# Patient Record
Sex: FEMALE | URBAN - METROPOLITAN AREA
[De-identification: names, ages, dates, MRNs, and addresses within clinical notes are randomized per-mention and may not be internally consistent; named-entity substitution may affect disease eponyms.]

---

## 2020-01-23 ENCOUNTER — MEDICAL CORRESPONDENCE (OUTPATIENT)
Dept: HEALTH INFORMATION MANAGEMENT | Facility: CLINIC | Age: 70
End: 2020-01-23

## 2020-06-08 ENCOUNTER — TRANSFERRED RECORDS (OUTPATIENT)
Dept: HEALTH INFORMATION MANAGEMENT | Facility: CLINIC | Age: 70
End: 2020-06-08

## 2020-06-11 ENCOUNTER — TRANSFERRED RECORDS (OUTPATIENT)
Dept: HEALTH INFORMATION MANAGEMENT | Facility: CLINIC | Age: 70
End: 2020-06-11

## 2020-06-12 ENCOUNTER — MEDICAL CORRESPONDENCE (OUTPATIENT)
Dept: HEALTH INFORMATION MANAGEMENT | Facility: CLINIC | Age: 70
End: 2020-06-12

## 2020-06-15 ENCOUNTER — TRANSCRIBE ORDERS (OUTPATIENT)
Dept: OTHER | Age: 70
End: 2020-06-15

## 2020-06-15 DIAGNOSIS — K86.1 CHRONIC PANCREATITIS (H): ICD-10-CM

## 2020-06-15 DIAGNOSIS — K86.89 DILATION OF PANCREATIC DUCT: ICD-10-CM

## 2020-06-15 DIAGNOSIS — K86.89 PANCREATIC ATROPHY: Primary | ICD-10-CM

## 2020-06-15 DIAGNOSIS — K86.89 PANCREATIC DUCT STONES: ICD-10-CM

## 2020-07-22 ENCOUNTER — DOCUMENTATION ONLY (OUTPATIENT)
Dept: CARE COORDINATION | Facility: CLINIC | Age: 70
End: 2020-07-22

## 2020-07-27 ENCOUNTER — VIRTUAL VISIT (OUTPATIENT)
Dept: GASTROENTEROLOGY | Facility: CLINIC | Age: 70
End: 2020-07-27
Payer: MEDICARE

## 2020-07-27 VITALS — HEIGHT: 63 IN | BODY MASS INDEX: 16.55 KG/M2 | WEIGHT: 93.4 LBS

## 2020-07-27 DIAGNOSIS — K86.0 ALCOHOL-INDUCED CHRONIC PANCREATITIS (H): Primary | ICD-10-CM

## 2020-07-27 RX ORDER — LISINOPRIL 5 MG/1
5 TABLET ORAL
COMMUNITY

## 2020-07-27 ASSESSMENT — PAIN SCALES - GENERAL: PAINLEVEL: NO PAIN (0)

## 2020-07-27 ASSESSMENT — MIFFLIN-ST. JEOR: SCORE: 907.79

## 2020-07-27 NOTE — LETTER
7/27/2020         RE: Angie Macias  [address]  [facility] [address]        Dear Colleague,    Thank you for referring your patient, Angie Macias, to the [facility]. Please see a copy of my visit note below.    Angie Macias is a 69 year old female who is being evaluated via a billable video visit.        During this virtual visit the patient is located in [place], patient verifies this as the location during the entirety of this visit.         Video-Visit Details    Type of service:  Video Visit    1st VideoStart:  07/27/2020 11:26 am   Stop: 07/27/2020 11:58 am    Originating Location (pt. Location): Home    Distant Location (provider location):  [facility]     Platform used for Video Visit: [employer]     Note below cut and pasted from the referral note to Alba Bentley RN    To summarize, this is a 69-year-old woman referred for evaluation of chronic pancreatitis, pancreatic stones, and severe malnutrition.  Unfortunately, during the visit, there are no records available through care everywhere in the scanned records into media as usual freeze the computer and do not allow viewing on the PC.  Most of the history is obtained from the patient and those limited records we could see.  The excellent summary by Alba is copied and pasted below.    Essentials of our history are a long history of alcohol abuse and the patient is still struggling, admitting to last drink only 2 months ago.  She underwent a revision of a gastric bypass by open surgery at [facility] in 2015.  This was due to frequent ulceration and stricturing.  It is not clear how much stomach she has remaining.  Her subsequent care is all at [facility] in [place], which is not available on care everywhere.  In any case she has had severe problems with malnutrition and maldigestion.  Apparently got down to a weight of 78 pounds.  Has a G-tube which she uses daily for the majority of her nutrition.  She was on  round-the-clock tube feeds but found that very inconvenient and is now on nighttime feedings.  She works with a dietitian at [place].    Of note is she was recently prescribed Creon capsules 6000 units 2 with each meal but the co-pay was $600 and did not fill the prescription.  Her stools are regular although not overtly greasy.  She has not had a fecal elastase that we know of.    Impression: We spent 30 minutes more than 50% counseling on a video virtual visit.  The patient has severe alcoholic chronic pancreatitis with obstructing stones as a secondary byproduct in the neck and body of the pancreas.  Her symptoms are mostly systemic fatigue and malnutrition related rather than intractable abdominal pain.  She still is struggling with alcoholism.  Her primary problem is severe malnutrition and maldigestion likely due to exocrine pancreatic insufficiency.  We discussed that even in the past of systemic health endoscopic removal of pancreatic stones is a drawnout process requiring several prolonged general anesthesias.  It may or may not ameliorate symptoms of chronic pancreatitis and certainly would not improve EPI.  Given her very fragile condition and a revised gastric bypass that would require even more procedures by mouth which may or may not be successful including endoscopic ultrasound placement of a lumen expanding stent from the pouch to the excluded stomach to create access.  I am not sure that would be possible but it would be very stressful and not advisable for this fragile patient    A recommendation #1 check fecal elastase in [place] #2 see if we can find a subsidized program for enzyme replacement that would ideally be liquid into the feeding tube to best mix with her nutrition in the duodenum rather than per oral which would go down the gastric bypass.  Also the dose needs to be adjusted far upwards to be the equivalent of 72,000 units with each meal or however that should be adjusted for  liquefied G-tube supplementation.   #3 the focus of her care at this stage is probably best palliative rather than interventional    I would like to thank her team for referring her and will ask Alba to contact the patient and her primary for follow-up regarding enzyme supplementation    Referring provider:  Dr Jose Miguel HANEY     Referred to: [facility]      Provider Requested:  Michael     Referral Received: 6/16/2020     Records received: 6/16/2020      Images received: requested 6/22     Evaluation for: pancreatic atrophy w/chronic pancreatitis with main pancreatic duct dilation and stone in pancreatic body- Hx RNY     Clinical History (per RN review):      Currently has Gtube for feeds r/t malnutrition from chronic pancreatitis.      Hospitalized May 2020 for pancreatitis, MRCP 6/8/2020, revealed atrophy of the pancreas representing chronic pancreatitis associated with marked ductal dilation of the pancreatic duct, 6.5 mm of the pancreatic tail, 2.5 mm the pancreatic head.  Stone identified in the pancreatic body with greater than 3 side branches which were all dilated and ectatic.  Variation in ductal anatomy with dorsal pancreatic duct draining the major papilla with the common bile duct.  This measures 1.2 mm in diameter, the ventral duct drains into caudal to the major papilla, some usual anatomy.     Triglyceride and IgG4 levels also obtained within normal limits.      MRCP done 6/8/2020     Impression     1.  Atrophy of the pancreas representing chronic pancreatitis associated with marked ductal dilation of the main pancreatic duct.     2.  Stone identified in the pancreatic body with greater than 3 side branches which are dilated and ectatic.     3.  Variation in ductal anatomy with the dorsal pancreatic duct draining at the major papilla in the common bile duct.  This measures 1.2 mm in diameter.  The ventral duct drains caudal to the major papilla a somewhat usual anatomy.       Other pertinent psychosocial notes:     Referring doctor note's show involvement of daughter and mom's care.  Daughter states that patient has been drinking alcohol, patient denies.  There is also some discussion the patient has an eating disorder.  Weight noted in clinic note is 88 pounds.  Currently on tube feeding.  Daughter states that patient previously said she has not went away over 100 pounds.  History of Ana Cristina-en-Y.     Daughter states mom has fallen 5 times since Monday.  Currently living with daughter.  Angie was planning on going to an inpatient behavioral health unit for alcohol abuse and depression but due to recent health issues she is unable to go with this time.     MD review date:   MD Decision for clinic consultation/Orders:            Referral updates/Patient contacted:        Deny Rodriguez MD

## 2020-07-27 NOTE — NURSING NOTE
"Chief Complaint   Patient presents with     Consult     Virtual consult       Vitals:    07/27/20 1059   Weight: 42.4 kg (93 lb 6.4 oz)   Height: 1.6 m (5' 3\")       Body mass index is 16.55 kg/m .      YESENIA CrabtreeT                      "

## 2020-07-27 NOTE — PROGRESS NOTES
"Angie Macias is a 69 year old female who is being evaluated via a billable video visit.      The patient has been notified of following:     \"This video visit will be conducted via a call between you and your physician/provider. We have found that certain health care needs can be provided without the need for an in-person physical exam.  This service lets us provide the care you need with a video conversation.  If a prescription is necessary we can send it directly to your pharmacy.  If lab work is needed we can place an order for that and you can then stop by our lab to have the test done at a later time.    Video visits are billed at different rates depending on your insurance coverage.  Please reach out to your insurance provider with any questions.    If during the course of the call the physician/provider feels a video visit is not appropriate, you will not be charged for this service.\"    Patient has given verbal consent for Video visit? Yes  How would you like to obtain your AVS? MyChart  If you are dropped from the video visit, the video invite should be resent to: Send to e-mail at: [email or web address]  Will anyone else be joining your video visit?         During this virtual visit the patient is located in [place], patient verifies this as the location during the entirety of this visit.         Video-Visit Details    Type of service:  Video Visit    1st VideoStart:  07/27/2020 11:26 am   Stop: 07/27/2020 11:58 am    Originating Location (pt. Location): Home    Distant Location (provider location):  [facility]     Platform used for Video Visit: [employer]     Note below cut and pasted from the referral note to Alba Bentley RN    To summarize, this is a 69-year-old woman referred for evaluation of chronic pancreatitis, pancreatic stones, and severe malnutrition.  Unfortunately, during the visit, there are no records available through care everywhere in the scanned records into media as usual freeze " the computer and do not allow viewing on the PC.  Most of the history is obtained from the patient and those limited records we could see.  The excellent summary by Alba is copied and pasted below.    Essentials of our history are a long history of alcohol abuse and the patient is still struggling, admitting to last drink only 2 months ago.  She underwent a revision of a gastric bypass by open surgery at [place] in 2015.  This was due to frequent ulceration and stricturing.  It is not clear how much stomach she has remaining.  Her subsequent care is all at [facility] in [place], which is not available on care everywhere.  In any case she has had severe problems with malnutrition and maldigestion.  Apparently got down to a weight of 78 pounds.  Has a G-tube which she uses daily for the majority of her nutrition.  She was on round-the-clock tube feeds but found that very inconvenient and is now on nighttime feedings.  She works with a dietitian at [facility].    Of note is she was recently prescribed Creon capsules 6000 units 2 with each meal but the co-pay was $600 and did not fill the prescription.  Her stools are regular although not overtly greasy.  She has not had a fecal elastase that we know of.    Impression: We spent 30 minutes more than 50% counseling on a video virtual visit.  The patient has severe alcoholic chronic pancreatitis with obstructing stones as a secondary byproduct in the neck and body of the pancreas.  Her symptoms are mostly systemic fatigue and malnutrition related rather than intractable abdominal pain.  She still is struggling with alcoholism.  Her primary problem is severe malnutrition and maldigestion likely due to exocrine pancreatic insufficiency.  We discussed that even in the past of systemic health endoscopic removal of pancreatic stones is a drawnout process requiring several prolonged general anesthesias.  It may or may not ameliorate symptoms of chronic pancreatitis and certainly  would not improve EPI.  Given her very fragile condition and a revised gastric bypass that would require even more procedures by mouth which may or may not be successful including endoscopic ultrasound placement of a lumen expanding stent from the pouch to the excluded stomach to create access.  I am not sure that would be possible but it would be very stressful and not advisable for this fragile patient    A recommendation #1 check fecal elastase in [place] #2 see if we can find a subsidized program for enzyme replacement that would ideally be liquid into the feeding tube to best mix with her nutrition in the duodenum rather than per oral which would go down the gastric bypass.  Also the dose needs to be adjusted far upwards to be the equivalent of 72,000 units with each meal or however that should be adjusted for liquefied G-tube supplementation.   #3 the focus of her care at this stage is probably best palliative rather than interventional    I would like to thank her team for referring her and will ask Alba to contact the patient and her primary for follow-up regarding enzyme supplementation    Referring provider:  Dr Jose Miguel HANEY     Referred to: [facility]      Provider Requested:  Michael     Referral Received: 6/16/2020     Records received: 6/16/2020      Images received: requested 6/22     Evaluation for: pancreatic atrophy w/chronic pancreatitis with main pancreatic duct dilation and stone in pancreatic body- Hx RNY     Clinical History (per RN review):      Currently has Gtube for feeds r/t malnutrition from chronic pancreatitis.      Hospitalized May 2020 for pancreatitis, MRCP 6/8/2020, revealed atrophy of the pancreas representing chronic pancreatitis associated with marked ductal dilation of the pancreatic duct, 6.5 mm of the pancreatic tail, 2.5 mm the pancreatic head.  Stone identified in the pancreatic body with greater than 3 side branches which were all dilated and  ectatic.  Variation in ductal anatomy with dorsal pancreatic duct draining the major papilla with the common bile duct.  This measures 1.2 mm in diameter, the ventral duct drains into caudal to the major papilla, some usual anatomy.     Triglyceride and IgG4 levels also obtained within normal limits.      MRCP done 6/8/2020     Impression     1.  Atrophy of the pancreas representing chronic pancreatitis associated with marked ductal dilation of the main pancreatic duct.     2.  Stone identified in the pancreatic body with greater than 3 side branches which are dilated and ectatic.     3.  Variation in ductal anatomy with the dorsal pancreatic duct draining at the major papilla in the common bile duct.  This measures 1.2 mm in diameter.  The ventral duct drains caudal to the major papilla a somewhat usual anatomy.      Other pertinent psychosocial notes:     Referring doctor note's show involvement of daughter and mom's care.  Daughter states that patient has been drinking alcohol, patient denies.  There is also some discussion the patient has an eating disorder.  Weight noted in clinic note is 88 pounds.  Currently on tube feeding.  Daughter states that patient previously said she has not went away over 100 pounds.  History of Ana Cristina-en-Y.     Daughter states mom has fallen 5 times since Monday.  Currently living with daughter.  Angie was planning on going to an inpatient behavioral health unit for alcohol abuse and depression but due to recent health issues she is unable to go with this time.     MD review date:   MD Decision for clinic consultation/Orders:            Referral updates/Patient contacted:        Deny Rodriguez MD

## 2020-07-28 ENCOUNTER — PATIENT OUTREACH (OUTPATIENT)
Dept: GASTROENTEROLOGY | Facility: CLINIC | Age: 70
End: 2020-07-28

## 2020-07-28 NOTE — TELEPHONE ENCOUNTER
Called patient to discuss next steps in care plan after visit with     Follow up:    Dr. Rodriguez has outlined the following steps after your recent clinic visit:       #1 check fecal elastase in [place]- will fax to referring Jose Miguel HANEY Leah C, CORNELIO     #2 see if we can find a subsidized program for enzyme replacement that would ideally be liquid into the feeding tube to best mix with her nutrition in the duodenum rather than per oral which would go down the gastric bypass.  Also the dose needs to be adjusted far upwards to be the equivalent of 72,000 units with each meal or however that should be adjusted for liquefied G-tube supplementation.      Has gtube into remnant, uses Kangaroo pump. Will work on possibilities for enzymes after EPI documented with fecal elastase.    Alba Bentley RN Care Coordinator

## 2020-07-28 NOTE — PATIENT INSTRUCTIONS
Follow up:    Dr. Rodriguez has outlined the following steps after your recent clinic visit:       #1 check fecal elastase in [place]     #2 see if we can find a subsidized program for enzyme replacement that would ideally be liquid into the feeding tube to best mix with her nutrition in the duodenum rather than per oral which would go down the gastric bypass.  Also the dose needs to be adjusted far upwards to be the equivalent of 72,000 units with each meal or however that should be adjusted for liquefied G-tube supplementation.       Please call with any questions or concerns regarding your clinic visit today.    It is a pleasure being involved in your health care.    Contacts post-consultation depending on your need:    Schedule Clinic Appointments            [phone] # 1   M-F 7:30 - 5 pm    Alba Bentley RN Care Coordinator  [phone]     OR Procedure Scheduling                             [phone]    My Chart is available 24 hours a day and is a secure way to access your records and communicate with your care team.  I strongly recommend signing up if you haven't already done so, if you are comfortable with computers.  If you would like to inquire about this or are having problems with My Chart access, you may call [phone] or go online at [email or web address].  Please allow at least 24 hours for a response and extra time on weekends and Holidays.

## 2020-07-30 ENCOUNTER — PATIENT OUTREACH (OUTPATIENT)
Dept: GASTROENTEROLOGY | Facility: CLINIC | Age: 70
End: 2020-07-30

## 2020-08-01 ENCOUNTER — TRANSFERRED RECORDS (OUTPATIENT)
Dept: HEALTH INFORMATION MANAGEMENT | Facility: CLINIC | Age: 70
End: 2020-08-01

## 2020-08-06 ENCOUNTER — TRANSFERRED RECORDS (OUTPATIENT)
Dept: HEALTH INFORMATION MANAGEMENT | Facility: CLINIC | Age: 70
End: 2020-08-06

## 2020-08-10 ENCOUNTER — PATIENT OUTREACH (OUTPATIENT)
Dept: GASTROENTEROLOGY | Facility: CLINIC | Age: 70
End: 2020-08-10

## 2020-08-10 NOTE — TELEPHONE ENCOUNTER
Returned patients call. Her Fecal Elastase result is 34, showing severe pancreatic insufficiency. She is inquiring about liquid enzymes.     Options are Relizorb or viokase and bicarb into tube feeding q4 hours.     Message sent to Relizorb rep to see if Medicare covers meds. (she does not have secondary coverage)    Alba Bentley RN Care Coordinator

## 2020-08-12 NOTE — TELEPHONE ENCOUNTER
Called rep Javier Valle   [phone] to discuss- he sent forms to us to complete    Other contact:   Jennifer Cordoba MPA    [email or web address]  Direct: [phone]  P: [phone]  F: [phone] ml

## 2020-08-18 ENCOUNTER — PATIENT OUTREACH (OUTPATIENT)
Dept: GASTROENTEROLOGY | Facility: CLINIC | Age: 70
End: 2020-08-18

## 2020-08-18 NOTE — TELEPHONE ENCOUNTER
Called Carey at [facility] to discuss pancreatic enzymes for patient. Plan is to see if patient can use Reliazorb. Left message.     Called to update patient.Waiting for Dr. Rodriguez signature on a few documents, should be able to get wrapped up this week. Will keep her updated.    Alba Bentley RN Care Coordinator

## 2020-08-19 ENCOUNTER — PATIENT OUTREACH (OUTPATIENT)
Dept: GASTROENTEROLOGY | Facility: CLINIC | Age: 70
End: 2020-08-19

## 2020-08-25 ENCOUNTER — PATIENT OUTREACH (OUTPATIENT)
Dept: GASTROENTEROLOGY | Facility: CLINIC | Age: 70
End: 2020-08-25

## 2020-08-25 NOTE — TELEPHONE ENCOUNTER
Left message for Carey to fax most recent notes regarding patient tube feed formula, rate, weight history, etc to support medication petition for Reliazorb.    Left message for patient updating on status of med request.    Alba Bentley RN Care Coordinator

## 2020-08-27 ENCOUNTER — PATIENT OUTREACH (OUTPATIENT)
Dept: GASTROENTEROLOGY | Facility: CLINIC | Age: 70
End: 2020-08-27

## 2020-08-27 NOTE — TELEPHONE ENCOUNTER
Called again to discuss Reliazorb application    Says Creon made her regurgitate stomach acid through the tube, is getting better. Connection as not good, asked that I call her tomorrow to discuss further, and get updated info r/t Reliazorb.    We had bad connection pt asked that I call her back later.     Jevity 1.5,current formula, 950 ml daily, cycled on Joesph pump. Other information gathered for Reliazorb application.    Pt reporting taking creon now and not causing so many problems. Confirmed her feeds go into her stomach, encouraged her to continue taking creon when she eats and with feeds.    Currently 90lbs, not gaining weight, next visit with dietian in a few weeks, on 9/10. She will fax her insurance cards to us. I will fax her patient authorization form, fax to Dennis, [phone]    Called referring MD office, Jesusita Gillespie,  to get additional documentation to support med application. Left message to ask for last clinic notes including referral to [place], RD notes and weight history.    Alba Bentley, SH[facility] Care Coordinator

## 2020-09-30 ENCOUNTER — PATIENT OUTREACH (OUTPATIENT)
Dept: GASTROENTEROLOGY | Facility: CLINIC | Age: 70
End: 2020-09-30

## 2020-09-30 NOTE — TELEPHONE ENCOUNTER
Per Reliazorb rep, pts medication approved. Called to discuss with patient. Advised she'll need to reach back to her dietitian, she will call them to make sure they're up to date with TF plan with change to enzymes. Asked that she give new meds a bit to see how it affects her digestion/weight gain and to RTC as needed.    Alba Bentley, RN Care Coordinator

## 2020-10-23 ENCOUNTER — PATIENT OUTREACH (OUTPATIENT)
Dept: GASTROENTEROLOGY | Facility: CLINIC | Age: 70
End: 2020-10-23

## 2020-10-23 NOTE — TELEPHONE ENCOUNTER
Pt called in with questions about Reliazorb, she's using 1 cartridge, wondering if she needs 2.     Says she's on new formula, using enzymes, is gaining weight. Wondering if she can follow with dietitian here. Will forward referral to Birdie NUGENT

## 2020-10-27 NOTE — TELEPHONE ENCOUNTER
Called patient back, per rep she should be getting 60 cartridges per month. Left message to clarify and will communicate back with med rep.    Alba Bentley RN Care Coordinator

## 2020-10-28 ENCOUNTER — TELEPHONE (OUTPATIENT)
Dept: GASTROENTEROLOGY | Facility: CLINIC | Age: 70
End: 2020-10-28

## 2020-11-03 ENCOUNTER — TELEPHONE (OUTPATIENT)
Dept: GASTROENTEROLOGY | Facility: CLINIC | Age: 70
End: 2020-11-03

## 2021-01-04 ENCOUNTER — HEALTH MAINTENANCE LETTER (OUTPATIENT)
Age: 71
End: 2021-01-04

## 2021-08-26 ENCOUNTER — PATIENT OUTREACH (OUTPATIENT)
Dept: GASTROENTEROLOGY | Facility: CLINIC | Age: 71
End: 2021-08-26

## 2021-08-26 NOTE — TELEPHONE ENCOUNTER
Called patient to check in and see if she is still using Reliazorb cartridges.    Per patient she is doing very well, and thinks the Reliazorb cartriges are having a really positive impact on her health. She wishes to continue them. Advised we will send in refills . Is following closely with providers in [place].    JOVANNA

## 2021-10-10 ENCOUNTER — HEALTH MAINTENANCE LETTER (OUTPATIENT)
Age: 71
End: 2021-10-10

## 2022-01-30 ENCOUNTER — HEALTH MAINTENANCE LETTER (OUTPATIENT)
Age: 72
End: 2022-01-30

## 2022-08-16 ENCOUNTER — PATIENT OUTREACH (OUTPATIENT)
Dept: GASTROENTEROLOGY | Facility: CLINIC | Age: 72
End: 2022-08-16

## 2022-08-16 NOTE — TELEPHONE ENCOUNTER
Pt's Reliazorb needs reauthorization, pt not seen since 2020. Will need clinic visit to continue     Per patient, feedings are going down ok, seems to be tolerate feeds well, weight stabilized. Is eating less for pleasure, Is following with with local GI w/ [facility], Dr Daniel Batista on [facility]  [address], [place], [place] [id number] [phone]    Update sent to [facility]    ML

## 2022-09-24 ENCOUNTER — HEALTH MAINTENANCE LETTER (OUTPATIENT)
Age: 72
End: 2022-09-24

## 2023-01-29 ENCOUNTER — HEALTH MAINTENANCE LETTER (OUTPATIENT)
Age: 73
End: 2023-01-29

## 2023-05-08 ENCOUNTER — HEALTH MAINTENANCE LETTER (OUTPATIENT)
Age: 73
End: 2023-05-08

## 2024-07-14 ENCOUNTER — HEALTH MAINTENANCE LETTER (OUTPATIENT)
Age: 74
End: 2024-07-14

## 2024-12-12 NOTE — TELEPHONE ENCOUNTER
Called dietian to discsuss possible changes to tube feeds, which would be required if patient is to move to Reliazorb- she feels patient and daugter would have anxiety over change TF.    Left message to advise that we can discuss changing TF if patient moves to Reliazorb which is not guaranteed at this time.    ML  
Detail Level: Simple
Detail Level: Zone
Photo Preface (Leave Blank If You Do Not Want): Photographs were obtained today

## 2025-07-19 ENCOUNTER — HEALTH MAINTENANCE LETTER (OUTPATIENT)
Age: 75
End: 2025-07-19